# Patient Record
Sex: MALE | Race: BLACK OR AFRICAN AMERICAN | URBAN - METROPOLITAN AREA
[De-identification: names, ages, dates, MRNs, and addresses within clinical notes are randomized per-mention and may not be internally consistent; named-entity substitution may affect disease eponyms.]

---

## 2017-03-07 ENCOUNTER — HOSPITAL ENCOUNTER (EMERGENCY)
Facility: CLINIC | Age: 22
Discharge: PSYCHIATRIC OR SUBSTANCE USE FACILITY | End: 2017-03-08
Attending: EMERGENCY MEDICINE | Admitting: EMERGENCY MEDICINE
Payer: MEDICAID

## 2017-03-07 DIAGNOSIS — R45.851 SUICIDAL IDEATION: ICD-10-CM

## 2017-03-07 LAB
AMPHETAMINES UR QL SCN: ABNORMAL
BARBITURATES UR QL: ABNORMAL
BENZODIAZ UR QL: ABNORMAL
CANNABINOIDS UR QL SCN: ABNORMAL
COCAINE UR QL: ABNORMAL
OPIATES UR QL SCN: ABNORMAL
PCP UR QL SCN: ABNORMAL

## 2017-03-07 PROCEDURE — 80307 DRUG TEST PRSMV CHEM ANLYZR: CPT | Performed by: EMERGENCY MEDICINE

## 2017-03-07 PROCEDURE — 99285 EMERGENCY DEPT VISIT HI MDM: CPT | Mod: 25

## 2017-03-07 PROCEDURE — 90791 PSYCH DIAGNOSTIC EVALUATION: CPT

## 2017-03-08 ENCOUNTER — HOSPITAL ENCOUNTER (INPATIENT)
Facility: CLINIC | Age: 22
LOS: 1 days | Discharge: HOME | DRG: 881 | End: 2017-03-08
Attending: PSYCHIATRY & NEUROLOGY | Admitting: PSYCHIATRY & NEUROLOGY
Payer: MEDICAID

## 2017-03-08 VITALS
OXYGEN SATURATION: 98 % | HEIGHT: 75 IN | BODY MASS INDEX: 20.26 KG/M2 | RESPIRATION RATE: 16 BRPM | WEIGHT: 162.92 LBS | DIASTOLIC BLOOD PRESSURE: 57 MMHG | SYSTOLIC BLOOD PRESSURE: 108 MMHG | TEMPERATURE: 97 F | HEART RATE: 69 BPM

## 2017-03-08 VITALS
HEIGHT: 74 IN | DIASTOLIC BLOOD PRESSURE: 65 MMHG | WEIGHT: 163 LBS | TEMPERATURE: 98.5 F | SYSTOLIC BLOOD PRESSURE: 112 MMHG | OXYGEN SATURATION: 100 % | RESPIRATION RATE: 16 BRPM | BODY MASS INDEX: 20.92 KG/M2

## 2017-03-08 DIAGNOSIS — F41.8 DEPRESSION WITH ANXIETY: Primary | ICD-10-CM

## 2017-03-08 PROBLEM — R45.851 SUICIDAL IDEATION: Status: ACTIVE | Noted: 2017-03-08

## 2017-03-08 LAB
CHOLEST SERPL-MCNC: 141 MG/DL
HDLC SERPL-MCNC: 59 MG/DL
LDLC SERPL CALC-MCNC: 72 MG/DL
NONHDLC SERPL-MCNC: 82 MG/DL
TRIGL SERPL-MCNC: 48 MG/DL

## 2017-03-08 PROCEDURE — 99235 HOSP IP/OBS SAME DATE MOD 70: CPT | Performed by: CLINICAL NURSE SPECIALIST

## 2017-03-08 PROCEDURE — 36415 COLL VENOUS BLD VENIPUNCTURE: CPT | Performed by: CLINICAL NURSE SPECIALIST

## 2017-03-08 PROCEDURE — 80061 LIPID PANEL: CPT | Performed by: CLINICAL NURSE SPECIALIST

## 2017-03-08 PROCEDURE — 25000132 ZZH RX MED GY IP 250 OP 250 PS 637: Performed by: CLINICAL NURSE SPECIALIST

## 2017-03-08 PROCEDURE — 12400001 ZZH R&B MH UMMC

## 2017-03-08 RX ORDER — OLANZAPINE 10 MG/2ML
10 INJECTION, POWDER, FOR SOLUTION INTRAMUSCULAR
Status: DISCONTINUED | OUTPATIENT
Start: 2017-03-08 | End: 2017-03-08 | Stop reason: HOSPADM

## 2017-03-08 RX ORDER — TRAZODONE HYDROCHLORIDE 50 MG/1
50 TABLET, FILM COATED ORAL
Status: DISCONTINUED | OUTPATIENT
Start: 2017-03-08 | End: 2017-03-08 | Stop reason: HOSPADM

## 2017-03-08 RX ORDER — ACETAMINOPHEN 325 MG/1
650 TABLET ORAL EVERY 4 HOURS PRN
Status: DISCONTINUED | OUTPATIENT
Start: 2017-03-08 | End: 2017-03-08 | Stop reason: HOSPADM

## 2017-03-08 RX ORDER — OLANZAPINE 10 MG/1
10 TABLET ORAL
Status: DISCONTINUED | OUTPATIENT
Start: 2017-03-08 | End: 2017-03-08 | Stop reason: HOSPADM

## 2017-03-08 RX ORDER — ALUMINA, MAGNESIA, AND SIMETHICONE 2400; 2400; 240 MG/30ML; MG/30ML; MG/30ML
30 SUSPENSION ORAL EVERY 4 HOURS PRN
Status: DISCONTINUED | OUTPATIENT
Start: 2017-03-08 | End: 2017-03-08 | Stop reason: HOSPADM

## 2017-03-08 RX ORDER — HYDROXYZINE HYDROCHLORIDE 25 MG/1
25-50 TABLET, FILM COATED ORAL EVERY 4 HOURS PRN
Status: DISCONTINUED | OUTPATIENT
Start: 2017-03-08 | End: 2017-03-08 | Stop reason: HOSPADM

## 2017-03-08 RX ORDER — BISACODYL 10 MG
10 SUPPOSITORY, RECTAL RECTAL DAILY PRN
Status: DISCONTINUED | OUTPATIENT
Start: 2017-03-08 | End: 2017-03-08 | Stop reason: HOSPADM

## 2017-03-08 RX ADMIN — SERTRALINE HYDROCHLORIDE 50 MG: 50 TABLET ORAL at 11:31

## 2017-03-08 NOTE — PROGRESS NOTES
03/08/17 0210   Patient Belongings   Did you bring any home meds/supplements to the hospital?  No   Patient Belongings clothing;shoes;other (see comments)  (Lighter)   Disposition of Belongings All items are in patient's storage bin.   Belongings Search Yes   Clothing Search Yes   Second Staff Rodgie     *No Items sent to security    Items in patient's storage bin: ; Black shoes w/ laces; Gray hooded sweatshirt w/ strings; Black t-shirt; patient rights paper; red coat; blue jeans; lighter    ADMISSION:  I am responsible for any personal items that are not sent to the safe or pharmacy. [facility] is not responsible for loss, theft or damage of any property in my possession.    Patient Signature _____________________ Date/Time _____________________    Staff Signature _______________________ Date/Time _____________________  2nd Staff person, if patient is unable/unwilling to sign  ___________________________________ Date/Time _____________________    DISCHARGE:  My personal items have been returned to me.   Patient Signature _____________________ Date/Time _____________________

## 2017-03-08 NOTE — IP AVS SNAPSHOT
[facility]    [facility] Station [address]AW    [address] [address]    Phone:  [phone]                                       After Visit Summary   3/8/2017    Blayne Phipps Jr.    MRN: [id number]           After Visit Summary Signature Page     I have received my discharge instructions, and my questions have been answered. I have discussed any challenges I see with this plan with the nurse or doctor.    ..........................................................................................................................................  Patient/Patient Representative Signature      ..........................................................................................................................................  Patient Representative Print Name and Relationship to Patient    ..................................................               ................................................  Date                                            Time    ..........................................................................................................................................  Reviewed by Signature/Title    ...................................................              ..............................................  Date                                                            Time

## 2017-03-08 NOTE — DISCHARGE SUMMARY
Psychiatric Discharge Summary    Blayne Phipps Jr. MRN# [id number]   Age: 21 year old YOB: 1995     Date of Admission:  3/8/2017  Date of Discharge:  3/8/2017  Admitting Physician:  Isma Zavala MD  Discharge Physician:  Debra Naegele APRN, CNS  (Contact: [phone])         Event Leading to Hospitalization:   Blayne Phipps Jr. is a 21-year-old -American male who is presenting to the emergency room with suicidal ideation. The patient states that he has had transient suicidal thoughts since he has been 17. He has used cutting in the past to relieve his pain. He has healed marks on his left forearm and biceps. He recently moved back to [place] from [place] on 2017. He is being in the [facility] [place] with his mother and father, 6 sisters, 3 nephews, his girlfriend and his  baby who is 4 months old. The patient reports that he does have a brother who is in group home. Blayne says that he has been under a lot of stress since moving back from [place] to [place] due to living in a hotel and trying to find a job. 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 When he was living in [place], he said he was a  at [employer]. Patient reports it has been very stressful living with his family in a hotel. He feels like he does not have a lot of privacy. He is reporting that he went out to a parking lot to get some air. He said he was looking over the edge, he saw someone smoking a cigarette, and he said that he wanted to ask that person for cigarette. His significant other had followed him out to the parking ramp and called the police. The patient states that when he was walking towards the individual to ask him for a cigarette he was stopped by the police and picked up. He is saying he did not intend to jump off the side. Patient reports he is future oriented. He wants to start his job as a cook at [employer]. He wants to do the right thing and  earn money for his 4-month-old child.            See Admission note by Debra Naegele APRN, CNS found on 3/8/2017 for additional details.          DIagnoses:   1. Major depressive disorder, first episode.          Labs:     Results for orders placed or performed during the hospital encounter of 03/08/17   Lipid panel reflex to direct LDL   Result Value Ref Range    Cholesterol 141 <200 mg/dL    Triglycerides 48 <150 mg/dL    HDL Cholesterol 59 >39 mg/dL    LDL Cholesterol Calculated 72 <100 mg/dL    Non HDL Cholesterol 82 <130 mg/dL            Consults:   No consults this admission.          Hospital Course:   Blayne Phipps Jr. was admitted to Station 4A with attending No att. providers found on a 72 hour mental health hold. The patient was placed under status 15 (15 minute checks) to ensure patient safety. 72 hour hold was discontinued. Patient was voluntary.     Patient reports he has been under a lot of stress since moving back to [place]. He has a 4 month child and has been living in a hotel with his parents, siblings significant other and 4 month old child. He does not feel that he has been getting enough privacy living in a hotel. He wants to get a place to live with his significant other an and child. He is future oriented and has a job in place as a cook at [employer].     Patient was interested in starting Sertraline. We discussed the risks, benefits and side effects of this medication. He was given written information about this medication. His mother was contacted and felt that he was safe to return home. He has protective factors of a supportive family. He is future oriented and wants to be able to work and provide for his family. Patient was interested in medication and was complaint with the medication. He was given resources so that he could attend therapy. He was provided with education on cannabis interfering with the efficacy of Sertraline and was advised to not use cannabis     Patient presented with  a stable mood. He denies having any suicidal or homicidal ideation. Blayne Phipps Jr. Did not  participate in groups and was visible in the milieu.The patient's symptoms of suicidal ideation improved.   He does not endorse any psychosis including any type of hallucinations. Patient denies a history of suicide attempts. He does endorse cutting as a way to relieve his psychic pain. He is a moderate risk due to the multiple stressors in his life.     Blayne Phipps Jr. was released to home. At the time of discharge Blayne Phipps Jr. was determined to not be a danger to himself or others.          Discharge Medications:     Discharge Medication List as of 3/8/2017 11:50 AM      START taking these medications    Details   sertraline (ZOLOFT) 50 MG tablet Take 1 tablet (50 mg) by mouth daily, Disp-30 tablet, R-1, E-Prescribe                  Psychiatric Examination:   Appearance:  awake, alert and adequately groomed  Attitude:  cooperative  Eye Contact:  good  Mood:  good  Affect:  appropriate and in normal range  Speech:  clear, coherent  Psychomotor Behavior:  no evidence of tardive dyskinesia, dystonia, or tics  Thought Process:  logical, linear and goal oriented  Associations:  no loose associations  Thought Content:  no evidence of suicidal ideation or homicidal ideation  Insight:  fair  Judgment:  fair  Oriented to:  time, person, and place  Attention Span and Concentration:  intact  Recent and Remote Memory:  intact  Language: Able to name objects, Able to repeat phrases and Able to read and write  Fund of Knowledge: adequate  Muscle Strength and Tone: normal  Gait and Station: Normal         Discharge Plan:    Health Care Follow-up Appointments:   Medication Management  Date: Thursday, 3/16/17  Time: 9:00am  Address: [facility]. [address], [place] [address].  Phone: [phone]  The [facility] has faxed the H&P, Facesheet, discharge summary and AVS (d/c meds included) to this clinic  at Fax: [phone] Attn: Naveen     Case Management  You have been referred for stabilization case management services through [facility]. A staff person will contact you to schedule an intake appointment. If you have not heard back within 24-48 hours please call [facility] at [phone] to follow-up     Attend all scheduled appointments with your outpatient providers. Call at least 24 hours in advance if you need to reschedule an appointment to ensure continued access to your outpatient providers.   Major Treatments, Procedures and Findings: You were provided with: a psychiatric assessment, assessed for medical stability, medication evaluation and/or management, group therapy and milieu management     Symptoms to Report: feeling more aggressive, increased confusion, losing more sleep, mood getting worse or thoughts of suicide     Early warning signs can include: increased depression or anxiety sleep disturbances increased thoughts or behaviors of suicide or self-harm      Safety and Wellness: Take all medicines as directed. Make no changes unless your doctor suggests them. Follow treatment recommendations. Refrain from alcohol and non-prescribed drugs. If there is a concern for safety, call 911.     Resources: Mental health crisis response for your [facility] is offered 24 hours a day, 7 days a week. A trained counselor will assess your current situation, offer support and counseling and connect you with local resources. Please call  [facility] Crisis (COPE) Response - Adult [phone]     The treatment team has appreciated the opportunity to work with you. Blayne, please take care and make your recovery a daily recovery. If you have any questions or concerns our unit number is [phone]. You will be receiving a follow-up phone call within the next three days from a representative from behavioral health. You have identified the best phone number to reach you as  [phone]       Attestation:  The patient has been seen and evaluated by me,  Debra Naegele APRN, CNS on 3/8/2017  Discharge time > 30 mintues

## 2017-03-08 NOTE — ED PROVIDER NOTES
"  History     Chief Complaint:  Suicidal    HPI   Blayne Phipps Jr. is a 21 year old male who presents via EMS to the emergency department today with suicidal ideation. The patient states that for years he has had transient suicidal thoughts and in the past he has cut himself to relief his pain. 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 Today he told his child's mother and parents that he was going to kill himself by jumping off the parking garage at the hotel that they are staying at prompting his child's mother to call EMS. His significant other found him on the 3rd floor of the ramp looking over the edge.  Here in the ED he states that he wants to go home and states that he is suicidal, but wants to be discharged because \"I'll be alright.\" He denies recent drug use, but in the past he has smoked marijuana. Denies physical complaints at this time.     Allergies:  No Known Drug Allergies     Medications:    The patient is currently on no regular medications.      Past Medical History:    History reviewed. No pertinent past medical history.     Past Surgical History:    History reviewed. No pertinent past surgical history.     Family History:    History reviewed. No pertinent family history.        Social History:  The patient was accompanied to the ED by EMS.  Marital Status: Single     Review of Systems   Psychiatric/Behavioral: Positive for suicidal ideas. Negative for self-injury.   All other systems reviewed and are negative.    Physical Exam   First Vitals:  BP: 127/88  Heart Rate: 80  Temp: 98.5  F (36.9  C)  Resp: 16  Height: 188 cm (6' 2\")  Weight: 73.9 kg (163 lb)  SpO2: 99 %      Physical Exam  Eye:  Pupils are equal, round, and reactive.  Extraocular movements intact.    ENT:  No rhinorrhea.  " Moist mucus membranes.  Normal tongue and tonsil.    Cardiac:  Regular rate and rhythm.  No murmurs, gallops, or rubs.    Pulmonary:  Clear to auscultation bilaterally.  No wheezes, rales, or rhonchi.    Abdomen:  Positive bowel sounds.  Abdomen is soft and non-distended, without focal tenderness.    Musculoskeletal:  Normal movement of all extremities without evidence for deficit.    Skin:  Warm and dry without rashes.    Neurologic:  Non-focal exam without asymmetric weakness or numbness.     Psychiatric:  The patient is tearful with flat affect, endorsing suicidal ideation by jumping off a parking garage.     Emergency Department Course     Laboratory:  Laboratory findings were communicated with the patient who voiced understanding of the findings.    Drug abuse screen 77 urine: Cannabinoids Positive      Emergency Department Course:  Nursing notes and vitals reviewed.  I performed an exam of the patient as documented above.     2125: I spoke with DEC regarding the patient. DEC states that the patient was walking to the parking ramp when his child's mother called EMS. DEC states that his girlfriend will not let the patient out of ear sight.     2152: Placed on a 72 hour hold.     2220: I spoke with the patient's mother via phone.     The patient will transferred by ambulance via EMS.     Impression & Plan      Medical Decision Making:  Blayne Phipps Jr. is a 21 year old male who presents to the emergency department today with suicidal ideation with plan. His significant other found him on the roof of a parking ramp preparing to jump. The patient denies being suicidal at this time, though does note that he has had years of depression and thoughts of self harm. He request to be discharged, but I feel that he is too high risk considering his actions tonight. Furthermore, he does not have any realistic plan for follow up. I had my DEC liaison evaluate the patient and she concurs that admission would be prudent. The  patient was placed on a 72 hours hold and will be admitted to the next available psychiatrist.    When I made the patient aware of the plan for admission, he became irate, screaming and stomping about the room.  Security was called for personal and staff protection.  The patient demanded I speak with his mother, which I did.  She voiced concerns for his safety with his history of depression and supported admission to the hospital.  I allowed the patient to speak with his mother, and with this, he calmed down, agreeing to be admitted.  However, he is very focused on being discharged tomorrow, planning to say anything he has to to get out of the hospital as soon as possible.  I urged him to use this time to get right and seek help for his psychiatric issues, as his uncontrolled impulsive behaviors make him very high risk for self harm or harm of others.    Diagnosis:    ICD-10-CM    1. Suicidal ideation R45.851 Drug abuse screen 77 urine ([place],)      Scribe Disclosure:  I, Cam Todd, am serving as a scribe at 8:37 PM on 3/7/2017 to document services personally performed by Trierweiler, Chad A, MD, based on my observations and the provider's statements to me.   3/7/2017    EMERGENCY DEPARTMENT       Trierweiler, Chad A, MD  03/07/17 8183

## 2017-03-08 NOTE — PROGRESS NOTES
[facility] met with patient for discharge planning as patient was admitted early this morning and is discharging today.   [facility] obtained collateral information from patient's mother Ana Maria at at [phone]. She stated she has no concerns regarding patient discharging today and plans to pick him up today.   Patient denied thoughts to harm himself and denied thoughts to harm others. We discussed a referral for a stablization  through [facility] and pt was interested in this service.   Writer faxed referral to [facility] at [phone]

## 2017-03-08 NOTE — ED NOTES
Bed: ED16  Expected date:   Expected time:   Means of arrival:   Comments:  Javier Solis Sucidal 21 male

## 2017-03-08 NOTE — PROGRESS NOTES
DISCHARGE: This RN has reviewed all meds and aftercare plan with pt and he states he understood the plan. Pt denies any SI and only endorses mild depression. All belongings returned. Pt bright and smiling upon DC.

## 2017-03-08 NOTE — ED NOTES
"Writer called [facility] just now to give report and was told by staff there that the RN was \"still in report\" and that they will call writer back in 5-10 minutes.  "

## 2017-03-08 NOTE — DISCHARGE INSTRUCTIONS
Behavioral Discharge Planning and Instructions      Summary: You were admitted on 3/8/2017 to Station 4A for suicidal ideation.  You were treated by Debra Naegele, APRN, CNS and discharged on 03/08/17.     Disposition: Discharged to home    Main Diagnosis:  Major depressive disorder    Health Care Follow-up Appointments:   Medication Management  Date:  Thursday, 3/16/17  Time: 9:00am  Address: [facility]. [address], [place] [address].  Phone:  [phone]  The [facility] has faxed the H&P, Facesheet, discharge summary and AVS (d/c meds included) to this clinic at Fax: [phone] Attn: Naveen    Case Management  You have been referred for stabilization case management services through [facility]. A staff person will contact you to schedule an intake appointment. If you have not heard back within 24-48 hours please call [facility] at [phone] to follow-up    Attend all scheduled appointments with your outpatient providers. Call at least 24 hours in advance if you need to reschedule an appointment to ensure continued access to your outpatient providers.   Major Treatments, Procedures and Findings: You were provided with: a psychiatric assessment, assessed for medical stability, medication evaluation and/or management, group therapy and milieu management    Symptoms to Report: feeling more aggressive, increased confusion, losing more sleep, mood getting worse or thoughts of suicide    Early warning signs can include:  increased depression or anxiety sleep disturbances increased thoughts or behaviors of suicide or self-harm     Safety and Wellness:  Take all medicines as directed.  Make no changes unless your doctor suggests them.      Follow treatment recommendations.  Refrain from alcohol and non-prescribed drugs.  If there is a concern for safety, call [phone].    Resources: Mental health crisis response for [facility] is offered 24 hours a day, 7 days a week. A  trained counselor will assess your current situation, offer support and counseling and connect you with local resources. Please call  [facility] Crisis (COPE) Response - Adult [phone]    The treatment team has appreciated the opportunity to work with you. Blayne, please take care and make your recovery a daily recovery. If you have any questions or concerns our unit number is [phone].  You will be receiving a follow-up phone call within the next three days from a representative from behavioral health.  You have identified the best phone number to reach you as [phone]

## 2017-03-08 NOTE — ED NOTES
Pt told at 2155 by provider that he was on a hold. Pt immediately became increasingly agitated. Became worse with time,verbally abusive toward writer and staff, using racial slurs and obscenities. Pacing, posturing and threatening behavior. Refuses to be here in hospital. Pt moved to  area, cooperated with move. Pt's mother contacted via phone by provider, pt given opportunity to talk with mother.

## 2017-03-08 NOTE — PLAN OF CARE
Problem: Depressive Symptoms  Goal: Depressive Symptoms  Signs and symptoms of listed problems will be absent or manageable.    Patient, prior to discharge, will:  -verbalize decrease in depressive signs/symptoms  -verbalize a decrease in anxiety   -verbalize an understanding of medication regimen   -verbalize absence of SI/SIB   -develop a safety plan  -identify a support system   -will participate in coordination of discharge planning    To promote safety/ mental health    Patient identified the following   Triggers:  ----------  Wellness Strategies:  ----------  Warning Signs:  ----------  Feedback (people they would like to receive feedback from if early warning signs - ex. Friends, family, partner/spouse, support groups):  ----------  Taking Action:  ----------  Ways to [place]:      Self-Reflection & Planning.  Assessed patient s progress completing forms related to Illness Management Recovery (including Personal Plan of Care, Adult Coping Plan, and My Support and Coping Plan) and assisted as needed.    Encouraged patient to continue to consider triggers, wellness strategies, early warning signs, feedback from others, actions to take to prevent relapse, and coping strategies as part of a plan to remain well after leaving the hospital.           Pt admitted to station 4A on a 72-hour hold from [facility] for suicidal ideation via jumping off the hotel parking ramp. Copies of the hold paperwork and pt's rights are in the front pocket of the chart. Per report, pt has a history of depression and anxiety. Pt's girlfriend reportedly called 911 after pt went to the parking ramp and verbalized intent to jump off. Pt also reportedly made suicidal comments during the assessment. Per report, pt became agitated, verbally abusive, and postured toward staff after being placed on the hold, but became cooperative when it was explained to him that his behavior could affect how long he is hospitalized. This is pt's  "first mental health hospitalization. Upon arriving to the unit, pt as searched by two male staff. Pt was cooperative with the search, vitals, and the admission interview. Pt stated that he was not suicidal, that he and his girlfriend had gotten into a fight and he went up on the parking ramp to \"cool off.\" Pt reports that he and his family, which includes his mother, sisters, girlfriend and their 4 month old son, niece, and nephews, just moved to [place] from [place] approximately two weeks ago. They are currently homeless and are staying in a hotel. Reports that the move, being unemployed, being a new father, and conflict with his girlfriend are all stressors. 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 Pt reports that he smokes cigarettes to help cope with stress, but denies alcohol use or current drug use. Reports that he did smoke marijuana but quit after moving to [place]. [facility]tox was positive for marijuana. Pt reports possible depression in the past and admits to past SIB via cutting when aged [age]. Pt denies current depression, suicidal ideation, or thoughts/urges to engage in SIB. Reports he was \"feeling better knowing I have a job.\" Pt was not taking any medications prior to admission. Status 15 initiated.       "

## 2017-03-08 NOTE — ED NOTES
Writer called [address] to give report and staff there told writer they are not ready to take report and they will call me back in 10-15 minutes.

## 2017-03-08 NOTE — IP AVS SNAPSHOT
"    [facility]PAT[facility] MENTAL HEALTH: [phone]                                              INTERAGENCY TRANSFER FORM - LAB / IMAGING / EKG / EMG RESULTS   3/8/2017                    Hospital Admission Date: 3/8/2017  NOVA HARRINGTON JR.   : 1995  Sex: Male        Attending Provider: Isma Zavala MD     Allergies:  No Known Allergies    Infection:  None   Service:  MENTAL HEALT    Ht:  1.9 m (6' 2.8\")   Wt:  73.9 kg (162 lb 14.7 oz)   Admission Wt:  73.9 kg (162 lb 14.7 oz)    BMI:  20.47 kg/m 2   BSA:  1.97 m 2            Patient PCP Information     Provider PCP Type    Physician No Ref-Primary General         Lab Results - 3 Days      Lipid panel reflex to direct LDL [[id number]]  Resulted: 17 1042, Result status: Final result    Ordering provider: Naegele, Debra Ann, APRN CNP  17 0754 Resulting lab: [facility]    Specimen Information    Type Source Collected On   Blood  17 1012          Components       Value Reference Range Flag Lab   Cholesterol 141 <200 mg/dL  13   Triglycerides 48 <150 mg/dL  13   HDL Cholesterol 59 >39 mg/dL  13   LDL Cholesterol Calculated 72 <100 mg/dL  13   Comment:  Desirable:       <100 mg/dl   Non HDL Cholesterol 82 <130 mg/dL  13            Testing Performed By     Lab - Abbreviation Name Director Address Valid Date Range    13 - Unknown [facility] Unknown [address] [facility] [id number] 01/15/15 0916 - Present            Unresulted Labs (24h ago through future)    Start       Ordered    17 0630  CBC with platelets  AM DRAW,   Routine     Comments:  Last Lab Result: No results found for: HGB    178    17 0630  TSH with free T4 reflex and/or T3 as indicated  AM DRAW,   Routine     Comments:  Last Lab Result: No results found for: TSH    17 0630  Comprehensive metabolic panel  AM DRAW,   Routine      17      Encounter-Level " Documents:     There are no encounter-level documents.      Order-Level Documents:     There are no order-level documents.

## 2017-03-08 NOTE — PHARMACY-ADMISSION MEDICATION HISTORY
Admission Medication History    Admission medication history interview status for the 3/7/2017 admission is complete.    Patient reports using no medications prior to this admission.      Emeka Galan, AlisaD

## 2017-03-08 NOTE — ED NOTES
Bed: [facility]  Expected date:   Expected time:   Means of arrival:   Comments:  For room 16

## 2017-03-08 NOTE — IP AVS SNAPSHOT
MRN:[id number]                      After Visit Summary   3/8/2017    Blayne Phipps Jr.    MRN: [id number]           Patient Information     Date Of Birth          1995        About your hospital stay     You were admitted on:  March 8, 2017 You last received care in the:  [facility]    You were discharged on:  March 8, 2017       Who to Call     For medical emergencies, please call 911.  For non-urgent questions about your medical care, please call your primary care provider or clinic, None          Attending Provider     Provider Specialty    Isma Zavala MD Psychiatry       Primary Care Provider    Physician No Ref-Primary       No address on file        Further instructions from your care team       Behavioral Discharge Planning and Instructions      Summary: You were admitted on 3/8/2017 to Station [facility] for suicidal ideation.  You were treated by Debra Naegele, APRN, CNS and discharged on 03/08/17.     Disposition: Discharged to home    Main Diagnosis:  Major depressive disorder    Health Care Follow-up Appointments:   Medication Management  Date:  Thursday, 3/16/17  Time: 9:00am  Address: [facility]. [address], [place] [address].  Phone:  [phone]  [facility] has faxed the H&P, Facesheet, discharge summary and AVS (d/c meds included) to this clinic at Fax: [phone] Attn: Naveen    Case Management  You have been referred for stabilization case management services through [facility]. A staff person will contact you to schedule an intake appointment. If you have not heard back within 24-48 hours please call [facility] at [phone] to follow-up    Attend all scheduled appointments with your outpatient providers. Call at least 24 hours in advance if you need to reschedule an appointment to ensure continued access to your outpatient providers.   Major Treatments, Procedures and Findings: You were  "provided with: a psychiatric assessment, assessed for medical stability, medication evaluation and/or management, group therapy and milieu management    Symptoms to Report: feeling more aggressive, increased confusion, losing more sleep, mood getting worse or thoughts of suicide    Early warning signs can include:  increased depression or anxiety sleep disturbances increased thoughts or behaviors of suicide or self-harm     Safety and Wellness:  Take all medicines as directed.  Make no changes unless your doctor suggests them.      Follow treatment recommendations.  Refrain from alcohol and non-prescribed drugs.  If there is a concern for safety, call 911.    Resources: Mental health crisis response for your [facility] is offered 24 hours a day, 7 days a week. A trained counselor will assess your current situation, offer support and counseling and connect you with local resources. Please call  [facility] Crisis (COPE) Response - Adult [phone]    The treatment team has appreciated the opportunity to work with you. Blayne, please take care and make your recovery a daily recovery. If you have any questions or concerns our unit number is [phone].  You will be receiving a follow-up phone call within the next three days from a representative from behavioral health.  You have identified the best phone number to reach you as [phone]              Pending Results     No orders found from 3/6/2017 to 3/9/2017.            Admission Information     Date & Time Provider Department Dept. Phone    3/8/2017 Isma Zavala MD [facility] [phone]      Your Vitals Were     Blood Pressure Pulse Temperature Respirations Height Weight    108/57 69 97  F (36.1  C) (Oral) 16 1.9 m (6' 2.8\") 73.9 kg (162 lb 14.7 oz)    Pulse Oximetry BMI (Body Mass Index)                98% 20.47 kg/m2          MyChart Information     [employer] lets you send messages to your doctor, view your test results, " "renew your prescriptions, schedule appointments and more. To sign up, go to [email or web address] . Click on \"Log in\" on the left side of the screen, which will take you to the Welcome page. Then click on \"Sign up Now\" on the right side of the page.     You will be asked to enter the access code listed below, as well as some personal information. Please follow the directions to create your username and password.     Your access code is: [email or web address]  Expires: 2017 11:43 AM     Your access code will  in 90 days. If you need help or a new code, please call your [facility] clinic or [phone].        Care EveryWhere ID     This is your Care EveryWhere ID. This could be used by other organizations to access your [facility] medical records  [id number]           Review of your medicines      START taking        Dose / Directions    sertraline 50 MG tablet   Commonly known as:  ZOLOFT   Used for:  Depression with anxiety        Dose:  50 mg   Take 1 tablet (50 mg) by mouth daily   Quantity:  30 tablet   Refills:  1            Where to get your medicines      These medications were sent to [facility] [address]  [address] [address]     Phone:  [phone]     sertraline 50 MG tablet                Protect others around you: Learn how to safely use, store and throw away your medicines at www.disposemymeds.org.             Medication List: This is a list of all your medications and when to take them. Check marks below indicate your daily home schedule. Keep this list as a reference.      Medications           Morning Afternoon Evening Bedtime As Needed    sertraline 50 MG tablet   Commonly known as:  ZOLOFT   Take 1 tablet (50 mg) by mouth daily   Last time this was given:  50 mg on 3/8/2017 11:31 AM                                  "

## 2017-03-08 NOTE — H&P
"DATE OF ASSESSMENT:  2017      IDENTIFYING INFORMATION:  Blayne Phipps Jr. is a 21-year-old  single male presenting with suicidal ideation.      CHIEF COMPLAINT:  \"I said I would do anything they wanted me to do and they sent me here.\"      HISTORY OF PRESENT ILLNESS:  Blayne Phipps Jr. is a 21-year-old -American male who is presenting to the emergency room with suicidal ideation.  The patient states that he has had transient suicidal thoughts since he has been 17.  He has used cutting in the past to relieve his pain.  He has healed marks on his left forearm and biceps.  He recently moved back to [place] from [place] on 2017.  He is being in the [facility] in [place] with his mother and father, 6 sisters, 3 nephews, his girlfriend and his  baby who is 4 months old.  The patient reports that he does have a brother who is in alf.  Blayne says that he has been under a lot of stress since moving back from [place] to [place] due to living in a hotel and trying to find a job.  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  When he was living in [place], he said he was a  at [employer].  Patient reports it has been very stressful living with his family in a hotel.  He feels like he does not have a lot of privacy.  He is reporting that he went out to a parking lot to get some air.  He said he was looking over the edge, he saw someone smoking a cigarette, and he said that he wanted to ask that person for cigarette.  His significant other had followed him out to the parking ramp and called the police.  The patient states that when he was walking towards the individual to ask him for a cigarette he was stopped by the police and picked up.  He is saying he did not intend to jump off the side.  Patient reports he is future oriented.  He wants to start his job as a cook at [employer].  He wants to do the right thing and earn money for his " "4-month-old child.      PSYCHIATRIC REVIEW OF SYSTEMS:  The patient reports that he is under a lot of stress.  He said he stressed out because he was in a hotel with his family and he is homeless.  He is reporting a lot of anxiety including restlessness and agitation.  He says that he is depressed but not suicidal.  He does not have a plan to harm himself.  The patient states that he does not have any problems with fatigue, poor appetite or poor concentration.  He is telling me that he is hopeful for the future.  He really wants to get to his job at [employer].  Patient denies having any homicidal thoughts.  He denies having any racing thoughts or other symptoms of cyndi.  He does not endorse psychosis including auditory or visual hallucinations or feelings of paranoia.  The patient denies having any panic attacks.      PSYCHIATRIC HISTORY:  This is patient's first inpatient hospitalization.  He has had no prior mental health treatments.  He has not been on any medications; the patient states that he has a sister who was taking \"something\" for PTSD.  He said that he was not interested in medications because she had a bad reaction to whatever she was taking for PTSD.  The patient reports that he has engaged in cutting since he has been about 16 or 17 years old.  He has healed cut marks on his left bicep and forearms.  He is saying that he has never attempted suicide in the past.  He reports that he has had chronic thoughts of suicidal ideation but he says that he has been able to manage them and has never attempted to kill himself.  The patient reports that cutting is a relief for him.  He feels that he would be able to manage his depression.  The patient reports that he is under more stress because of having a child.  He feels that his significant other is not giving him enough space.      PAST MEDICAL HISTORY:  No active issues reported.      SUBSTANCE ABUSE HISTORY:  The patient reports that he uses cannabis.  His " U-tox was positive for cannabis.  He denies any other drug use.      FAMILY HISTORY:  The patient reports that he has a sister who has PTSD and has been treated for it.      SOCIAL HISTORY:  The patient is originally from [place].  He moved with his family to [place] for about 6-7 months because his mother got a job in [place].  The family has moved back to [place] and is starting over.  They are living in [place] at the [facility].  The patient reports that when he was in [place] he was working as a  at [employer].  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  He wants to be responsible and earn money to support his 4-month-old child.  He is concerned that his significant other does not have a job yet.      MEDICAL REVIEW OF SYSTEMS:  Reviewed documentation for a 10-point systems review completed by Dr. Chad Trierweiler dated 03/07/2017.  No changes are noted.      PHYSICAL EXAMINATION:   VITAL SIGNS:  Blood pressure is 127/88, heart rate is 80, temperature 98.5 Fahrenheit, respirations 16.  Height is 6 feet 2 inches, weight is 163 pounds.  SpO2 is at 99%.     Reviewed documentation for the remainder of the physical examination by Dr. Chad Trierweiler dated 03/07/2017.  No changes are noted.      MENTAL STATUS EXAMINATION:  The patient appears his stated age.  He is dressed in scrubs.  He has adequate hygiene.  He was sitting in the lounge area waiting for me to meet with him.  He was cooperative and accompanied me to the exam room.  The patient was calm and cooperative during the interview.  He maintained adequate eye contact.  The patient stated that he wanted to do whatever he needed to do so that he could be discharged.  No psychomotor abnormalities noted.  Speech was spontaneous.  He used conversational rate, rhythm and tone.  He was not pressured.  He elaborated appropriately with regard to what happened yesterday at the parking ramp.  He described his mood today as  "\"good.\"  He denies having any suicidal ideation.  His affect was full range.  The patient's thought process was logical and organized.  No loose associations were noted.  Thought content did not display any evidence of psychosis including auditory or visual hallucinations.  He denied having any homicidal thoughts.  Judgment and insight appeared to be intact.  Cognition appears intact to interviewing including orientation to person, place, time and situation.  His use of language and fund of knowledge.  His recent and remote memory are grossly intact.  Muscle strength, tone and gait are within normal limits upon observation.      DIAGNOSES:   1.  Major depressive disorder, first episode.         PLAN:   1.  The patient has been admitted to behavioral unit 4A on a 72-hour hold which expires on 3/10..   2.  Discussed antidepressant medications with the patient including Lexapro and Zoloft.  Discussed risks, benefits and side effects of starting these medications to treat his depression.  The patient decided that he wanted to start Zoloft.   3.  Psychosocial treatments to be addressed with social work consult.   4.  The patient would benefit from therapy services in the community.         DEBRA A. NAEGELE, APRN, CNS             D: 2017 12:49   T: 2017 13:31   MT: MAN      Name:     NVOA HARRINGTON   MRN:      [id number]        Account:      [id number]   :      1995           Admitted:     [id number]      Document: [id number]      "